# Patient Record
Sex: MALE | Race: WHITE
[De-identification: names, ages, dates, MRNs, and addresses within clinical notes are randomized per-mention and may not be internally consistent; named-entity substitution may affect disease eponyms.]

---

## 2018-01-20 ENCOUNTER — HOSPITAL ENCOUNTER (EMERGENCY)
Dept: HOSPITAL 14 - H.ER | Age: 33
Discharge: HOME | End: 2018-01-20
Payer: SELF-PAY

## 2018-01-20 VITALS — SYSTOLIC BLOOD PRESSURE: 126 MMHG | DIASTOLIC BLOOD PRESSURE: 79 MMHG | TEMPERATURE: 98.5 F

## 2018-01-20 VITALS — RESPIRATION RATE: 18 BRPM

## 2018-01-20 VITALS — HEART RATE: 95 BPM

## 2018-01-20 DIAGNOSIS — J45.901: Primary | ICD-10-CM

## 2018-01-20 DIAGNOSIS — Z88.0: ICD-10-CM

## 2018-01-20 NOTE — ED PDOC
HPI: SOB/CHF/COPD


Time Seen by Provider: 01/20/18 10:02


Chief Complaint (Nursing): Shortness Of Breath


Chief Complaint (Provider): Allergy Induced Asthma


History Per: Patient


History/Exam Limitations: no limitations


Onset/Duration Of Symptoms: Days (1 day ago)


Current Symptoms Are (Timing): Still Present


Additional Complaint(s): 


31 y/o male presents to the ED complaining of allergy induced asthmatic episode

, onset of 1 day ago. Patient reports of wheezng that started last night, so in 

an attempt to alleviate his symptoms he used his nebulizer and pump without 

relief. Currently, his symptoms have minimally improved. He denies any fever, 

cough, or chest pain. 








Past Medical History


Reviewed: Historical Data, Nursing Documentation, Vital Signs


Vital Signs: 


 Last Vital Signs











Temp  98.5 F   01/20/18 09:23


 


Pulse  95 H  01/20/18 15:16


 


Resp  18   01/20/18 15:16


 


BP  126/79   01/20/18 09:23


 


Pulse Ox  98   01/20/18 15:16














- Medical History


PMH: Asthma





- Surgical History


Surgical History: Appendectomy





- Family History


Family History: States: Unknown Family Hx





- Social History


Current smoker - smoking cessation education provided: Yes


Alcohol: Social


Drugs: Denies





- Home Medications


Home Medications: 


 Ambulatory Orders











 Medication  Instructions  Recorded


 


Albuterol 0.083% [Albuterol 0.083% 3 ml IH Q6H PRN #30 neb 01/20/18





Inhal Sol (2.5 mg/3 ml) UD]  


 


Prednisone 50 mg PO DAILY #4 tab 01/20/18














- Allergies


Allergies/Adverse Reactions: 


 Allergies











Allergy/AdvReac Type Severity Reaction Status Date / Time


 


Penicillins Allergy  RASH Verified 01/20/18 09:23














Review of Systems


ROS Statement: Except As Marked, All Systems Reviewed And Found Negative


Constitutional: Negative for: Fever


Cardiovascular: Negative for: Chest Pain


Respiratory: Positive for: Wheezing.  Negative for: Cough





Physical Exam





- Reviewed


Nursing Documentation Reviewed: Yes


Vital Signs Reviewed: Yes





- Physical Exam


Appears: Positive for: Non-toxic, No Acute Distress


Head Exam: Positive for: ATRAUMATIC


Skin: Positive for: Normal Color, Warm


Eye Exam: Positive for: Normal appearance, EOMI, PERRL


ENT: Positive for: Normal ENT Inspection


Neck: Positive for: Normal, Painless ROM, Supple


Cardiovascular/Chest: Positive for: Regular Rate, Rhythm.  Negative for: Murmur


Respiratory: Positive for: Wheezing (minimal bilateral wheezing).  Negative for

: Respiratory Distress


Back: Positive for: Normal Inspection


Extremity: Positive for: Normal ROM.  Negative for: Pedal Edema, Deformity


Neurologic/Psych: Positive for: Alert, Oriented (speacking fufll sentences).  

Negative for: Motor/Sensory Deficits





- ECG


O2 Sat by Pulse Oximetry: 99 (RA)


Pulse Ox Interpretation: Normal





- Progress


ED Course And Treament: 





Advised additional nebulizer treatment, refusing stating he wants to leave.


Re-evaluation Time: 12:50


Condition: Re-examined, Improving,but remains with symptoms





Medical Decision Making


Medical Decision Making: 





Time:


--10:39


Impression: 


--Asthma Exacerbation


Plan:


--Chest X-ray


--Albuterol 3ml INH


--predisone 50mg PO


--peak flow pre/post tx





Pt refused CXR.





Scribe Attestation:


Documented by Justo Christian acting as a scribe for Charmaine Claros MD. 





Disposition





- Clinical Impression


Clinical Impression: 


 Asthma exacerbation








- Disposition


Disposition: Routine/Home


Disposition Time: 12:51


Condition: STABLE


Additional Instructions: 


FOLLOW-UP WITH YOUR PMD WITHIN 2 DAYS FOR REEVALUATION. 


Prescriptions: 


Albuterol 0.083% [Albuterol 0.083% Inhal Sol (2.5 mg/3 ml) UD] 3 ml IH Q6H PRN #

30 neb


 PRN Reason: Shortness Of Breath


Prednisone 50 mg PO DAILY #4 tab


Instructions:  Asthma (ED)


Forms:  [facility] (English)

## 2018-01-21 VITALS — OXYGEN SATURATION: 99 %
